# Patient Record
Sex: MALE | Race: WHITE | NOT HISPANIC OR LATINO | Employment: FULL TIME | RURAL
[De-identification: names, ages, dates, MRNs, and addresses within clinical notes are randomized per-mention and may not be internally consistent; named-entity substitution may affect disease eponyms.]

---

## 2019-04-03 ENCOUNTER — OFFICE VISIT (OUTPATIENT)
Dept: RETAIL CLINIC | Facility: CLINIC | Age: 54
End: 2019-04-03

## 2019-04-03 VITALS
WEIGHT: 260 LBS | OXYGEN SATURATION: 98 % | BODY MASS INDEX: 35.21 KG/M2 | RESPIRATION RATE: 14 BRPM | HEART RATE: 87 BPM | TEMPERATURE: 97.7 F | HEIGHT: 72 IN

## 2019-04-03 DIAGNOSIS — H65.01 RIGHT ACUTE SEROUS OTITIS MEDIA, RECURRENCE NOT SPECIFIED: Primary | ICD-10-CM

## 2019-04-03 PROCEDURE — 99213 OFFICE O/P EST LOW 20 MIN: CPT | Performed by: NURSE PRACTITIONER

## 2019-04-03 RX ORDER — METHYLPREDNISOLONE 4 MG/1
TABLET ORAL
Qty: 1 EACH | Refills: 0 | Status: SHIPPED | OUTPATIENT
Start: 2019-04-03 | End: 2022-07-15

## 2019-04-03 RX ORDER — AMOXICILLIN AND CLAVULANATE POTASSIUM 875; 125 MG/1; MG/1
1 TABLET, FILM COATED ORAL 2 TIMES DAILY
Qty: 10 TABLET | Refills: 0 | Status: SHIPPED | OUTPATIENT
Start: 2019-04-03 | End: 2022-07-15

## 2019-04-03 NOTE — PROGRESS NOTES
Heber Valdez is a 53 y.o. male.     Earache    There is pain in the right ear. This is a new problem. Episode onset: 2 days. The problem occurs every few minutes. The problem has been gradually worsening. Maximum temperature: subjective. The fever has been present for less than 1 day. The pain is at a severity of 7/10. The pain is moderate. Associated symptoms include coughing (nonproductive), neck pain and a sore throat. Pertinent negatives include no ear discharge or rhinorrhea. He has tried NSAIDs for the symptoms. The treatment provided no relief.        The following portions of the patient's history were reviewed and updated as appropriate: allergies, current medications, past family history, past medical history, past social history, past surgical history and problem list.    Review of Systems   Constitutional: Positive for fever.   HENT: Positive for congestion (nasal/chest), ear pain, sinus pressure, sinus pain and sore throat. Negative for ear discharge and rhinorrhea.         Right sided jaw pain     Respiratory: Positive for cough (nonproductive). Negative for chest tightness, shortness of breath and wheezing.    Gastrointestinal: Negative.    Musculoskeletal: Positive for neck pain.   Neurological: Positive for dizziness.       Objective   Physical Exam   Constitutional: He appears well-developed and well-nourished.   HENT:   Head: Normocephalic.   Right Ear: Ear canal normal. Tympanic membrane is erythematous and bulging. Tympanic membrane mobility is abnormal.   Left Ear: Tympanic membrane and ear canal normal.   Nose: Right sinus exhibits maxillary sinus tenderness. Left sinus exhibits maxillary sinus tenderness.   Mouth/Throat: Uvula is midline and mucous membranes are normal. Posterior oropharyngeal erythema present. No tonsillar exudate.   Cardiovascular: Normal rate and regular rhythm.   Pulmonary/Chest: Effort normal and breath sounds normal.   Lymphadenopathy:        Head (right  side): Tonsillar adenopathy present.        Head (left side): Tonsillar adenopathy present.     He has cervical adenopathy.   Skin: Skin is warm, dry and intact.   Vitals reviewed.      Assessment/Plan   Ray was seen today for earache.    Diagnoses and all orders for this visit:    Right acute serous otitis media, recurrence not specified    Other orders  -     amoxicillin-clavulanate (AUGMENTIN) 875-125 MG per tablet; Take 1 tablet by mouth 2 (Two) Times a Day.  -     methylPREDNISolone (MEDROL, MARGAUX,) 4 MG tablet; Take as directed on package instructions.          Visit information reviewed with patient, including medication prescribed and patient instructions. All questions answered and given to patient/and or caregiver.     If symptoms worsen with fever >103, please seek further evaluation in the ER. Please F/U with PCP with concerns/and questions.     Rae Duncan, APRN

## 2022-07-06 ENCOUNTER — HOSPITAL ENCOUNTER (EMERGENCY)
Facility: HOSPITAL | Age: 57
Discharge: HOME | End: 2022-07-06
Attending: EMERGENCY MEDICINE | Admitting: EMERGENCY MEDICINE

## 2022-07-06 ENCOUNTER — APPOINTMENT (OUTPATIENT)
Dept: GENERAL RADIOLOGY | Facility: HOSPITAL | Age: 57
End: 2022-07-06

## 2022-07-06 VITALS
RESPIRATION RATE: 18 BRPM | TEMPERATURE: 98.3 F | HEART RATE: 80 BPM | OXYGEN SATURATION: 100 % | SYSTOLIC BLOOD PRESSURE: 121 MMHG | DIASTOLIC BLOOD PRESSURE: 101 MMHG | WEIGHT: 236 LBS | BODY MASS INDEX: 33.79 KG/M2 | HEIGHT: 70 IN

## 2022-07-06 DIAGNOSIS — E86.0 DEHYDRATION: ICD-10-CM

## 2022-07-06 DIAGNOSIS — R07.9 ACUTE CHEST PAIN: Primary | ICD-10-CM

## 2022-07-06 DIAGNOSIS — N17.9 ACUTE KIDNEY INJURY: ICD-10-CM

## 2022-07-06 LAB
ALBUMIN SERPL-MCNC: 4.4 G/DL (ref 3.5–5.2)
ALBUMIN/GLOB SERPL: 1.7 G/DL
ALP SERPL-CCNC: 74 U/L (ref 39–117)
ALT SERPL W P-5'-P-CCNC: 23 U/L (ref 1–41)
ANION GAP SERPL CALCULATED.3IONS-SCNC: 12 MMOL/L (ref 5–15)
AST SERPL-CCNC: 17 U/L (ref 1–40)
BASOPHILS # BLD AUTO: 0.05 10*3/MM3 (ref 0–0.2)
BASOPHILS NFR BLD AUTO: 0.3 % (ref 0–1.5)
BILIRUB SERPL-MCNC: 1 MG/DL (ref 0–1.2)
BUN SERPL-MCNC: 21 MG/DL (ref 6–20)
BUN/CREAT SERPL: 15.4 (ref 7–25)
CALCIUM SPEC-SCNC: 9.2 MG/DL (ref 8.6–10.5)
CHLORIDE SERPL-SCNC: 99 MMOL/L (ref 98–107)
CO2 SERPL-SCNC: 21 MMOL/L (ref 22–29)
CREAT SERPL-MCNC: 1.36 MG/DL (ref 0.76–1.27)
DEPRECATED RDW RBC AUTO: 45.9 FL (ref 37–54)
EGFRCR SERPLBLD CKD-EPI 2021: 61.1 ML/MIN/1.73
EOSINOPHIL # BLD AUTO: 0.05 10*3/MM3 (ref 0–0.4)
EOSINOPHIL NFR BLD AUTO: 0.3 % (ref 0.3–6.2)
ERYTHROCYTE [DISTWIDTH] IN BLOOD BY AUTOMATED COUNT: 13.3 % (ref 12.3–15.4)
FLUAV RNA RESP QL NAA+PROBE: NOT DETECTED
FLUBV RNA RESP QL NAA+PROBE: NOT DETECTED
GLOBULIN UR ELPH-MCNC: 2.6 GM/DL
GLUCOSE SERPL-MCNC: 214 MG/DL (ref 65–99)
HCT VFR BLD AUTO: 49.5 % (ref 37.5–51)
HGB BLD-MCNC: 16.8 G/DL (ref 13–17.7)
HOLD SPECIMEN: NORMAL
IMM GRANULOCYTES # BLD AUTO: 0.14 10*3/MM3 (ref 0–0.05)
IMM GRANULOCYTES NFR BLD AUTO: 0.9 % (ref 0–0.5)
LIPASE SERPL-CCNC: 37 U/L (ref 13–60)
LYMPHOCYTES # BLD AUTO: 2.06 10*3/MM3 (ref 0.7–3.1)
LYMPHOCYTES NFR BLD AUTO: 13.6 % (ref 19.6–45.3)
MCH RBC QN AUTO: 31.9 PG (ref 26.6–33)
MCHC RBC AUTO-ENTMCNC: 33.9 G/DL (ref 31.5–35.7)
MCV RBC AUTO: 93.9 FL (ref 79–97)
MONOCYTES # BLD AUTO: 0.97 10*3/MM3 (ref 0.1–0.9)
MONOCYTES NFR BLD AUTO: 6.4 % (ref 5–12)
NEUTROPHILS NFR BLD AUTO: 11.83 10*3/MM3 (ref 1.7–7)
NEUTROPHILS NFR BLD AUTO: 78.5 % (ref 42.7–76)
NRBC BLD AUTO-RTO: 0 /100 WBC (ref 0–0.2)
NT-PROBNP SERPL-MCNC: 25.2 PG/ML (ref 0–900)
PLATELET # BLD AUTO: 276 10*3/MM3 (ref 140–450)
PMV BLD AUTO: 10.1 FL (ref 6–12)
POTASSIUM SERPL-SCNC: 4.3 MMOL/L (ref 3.5–5.2)
PROT SERPL-MCNC: 7 G/DL (ref 6–8.5)
QT INTERVAL: 334 MS
QTC INTERVAL: 452 MS
RBC # BLD AUTO: 5.27 10*6/MM3 (ref 4.14–5.8)
SARS-COV-2 RNA RESP QL NAA+PROBE: NOT DETECTED
SODIUM SERPL-SCNC: 132 MMOL/L (ref 136–145)
TROPONIN T SERPL-MCNC: <0.01 NG/ML (ref 0–0.03)
TROPONIN T SERPL-MCNC: <0.01 NG/ML (ref 0–0.03)
WBC NRBC COR # BLD: 15.1 10*3/MM3 (ref 3.4–10.8)
WHOLE BLOOD HOLD COAG: NORMAL
WHOLE BLOOD HOLD SPECIMEN: NORMAL

## 2022-07-06 PROCEDURE — 85025 COMPLETE CBC W/AUTO DIFF WBC: CPT | Performed by: EMERGENCY MEDICINE

## 2022-07-06 PROCEDURE — 84484 ASSAY OF TROPONIN QUANT: CPT | Performed by: EMERGENCY MEDICINE

## 2022-07-06 PROCEDURE — 25010000002 KETOROLAC TROMETHAMINE PER 15 MG: Performed by: PHYSICIAN ASSISTANT

## 2022-07-06 PROCEDURE — 93005 ELECTROCARDIOGRAM TRACING: CPT | Performed by: EMERGENCY MEDICINE

## 2022-07-06 PROCEDURE — 96361 HYDRATE IV INFUSION ADD-ON: CPT

## 2022-07-06 PROCEDURE — 71045 X-RAY EXAM CHEST 1 VIEW: CPT

## 2022-07-06 PROCEDURE — 83880 ASSAY OF NATRIURETIC PEPTIDE: CPT | Performed by: EMERGENCY MEDICINE

## 2022-07-06 PROCEDURE — 80053 COMPREHEN METABOLIC PANEL: CPT | Performed by: EMERGENCY MEDICINE

## 2022-07-06 PROCEDURE — 96374 THER/PROPH/DIAG INJ IV PUSH: CPT

## 2022-07-06 PROCEDURE — 99284 EMERGENCY DEPT VISIT MOD MDM: CPT

## 2022-07-06 PROCEDURE — 83690 ASSAY OF LIPASE: CPT | Performed by: EMERGENCY MEDICINE

## 2022-07-06 PROCEDURE — 36415 COLL VENOUS BLD VENIPUNCTURE: CPT

## 2022-07-06 PROCEDURE — 87636 SARSCOV2 & INF A&B AMP PRB: CPT | Performed by: PHYSICIAN ASSISTANT

## 2022-07-06 RX ORDER — KETOROLAC TROMETHAMINE 15 MG/ML
15 INJECTION, SOLUTION INTRAMUSCULAR; INTRAVENOUS ONCE
Status: COMPLETED | OUTPATIENT
Start: 2022-07-06 | End: 2022-07-06

## 2022-07-06 RX ORDER — SODIUM CHLORIDE 0.9 % (FLUSH) 0.9 %
10 SYRINGE (ML) INJECTION AS NEEDED
Status: DISCONTINUED | OUTPATIENT
Start: 2022-07-06 | End: 2022-07-06 | Stop reason: HOSPADM

## 2022-07-06 RX ORDER — ASPIRIN 81 MG/1
324 TABLET, CHEWABLE ORAL ONCE
Status: DISCONTINUED | OUTPATIENT
Start: 2022-07-06 | End: 2022-07-06 | Stop reason: HOSPADM

## 2022-07-06 RX ADMIN — SODIUM CHLORIDE 1000 ML: 9 INJECTION, SOLUTION INTRAVENOUS at 16:01

## 2022-07-06 RX ADMIN — KETOROLAC TROMETHAMINE 15 MG: 15 INJECTION, SOLUTION INTRAMUSCULAR; INTRAVENOUS at 17:32

## 2022-07-06 RX ADMIN — SODIUM CHLORIDE 2190 ML: 9 INJECTION, SOLUTION INTRAVENOUS at 17:06

## 2022-07-06 NOTE — DISCHARGE INSTRUCTIONS
Be sure to stay well-hydrated.  No strenuous activity for the next few days.  You will receive a call from registration by 10 AM on the next business day to schedule your appointment with the chest pain clinic.  If you do not receive a call by 10 AM tomorrow, call [phone], ask to speak with registration, and schedule your appointment with [facility] heart and valve chest pain clinic.  Begin taking a baby aspirin once daily.  Stop smoking.  Have a low threshold to return to the emergency department immediately if you have any change or worsening of symptoms.

## 2022-07-06 NOTE — ED PROVIDER NOTES
" EMERGENCY DEPARTMENT ENCOUNTER    Pt Name: Mic Valdez  MRN: [id number]  Pt :   1965  Room Number:    Date of encounter:  2022  PCP: Provider, No Known  ED Provider: Yon Cardona PA-C    Historian: Patient      HPI:  Chief Complaint: Chest and back pain        Context: Mic Valdez is a 57 y.o. male who presents to the ED c/o onset of chest and back pain when he was working outside in the heat.  Patient states he was working on his truck when he noticed a sudden \"tightness\" on the left side of his chest radiating around into his back, felt like a muscle spasm.  No shortness of breath, no nausea vomiting or diaphoresis.  Not exacerbated by activity and not alleviated with rest.  No arm neck jaw shoulder pain or palpitations.  Patient thinks he was overheated.  He was given 1 sublingual nitroglycerin by EMS.  He presents with a blood pressure 90/67 and a heart rate of 110.  He is now complaining of a dull frontal headache since given the nitroglycerin.  He is a smoker with no prior history of hypertension or known heart disease otherwise.  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      PAST MEDICAL HISTORY  Past Medical History:   Diagnosis Date   • Kidney stone          PAST SURGICAL HISTORY  No past surgical history on file.      FAMILY HISTORY  No family history on file.      SOCIAL HISTORY  Social History     Socioeconomic History   • Marital status: Single   Tobacco Use   • Smoking status: Current Every Day Smoker         ALLERGIES  Patient has no known allergies.        REVIEW OF SYSTEMS  Review of Systems   Constitutional: Positive for activity change. Negative for appetite change, chills, diaphoresis, fatigue and fever.   HENT: Negative for congestion, rhinorrhea and sore throat.    Respiratory: Negative for cough, shortness of breath and wheezing.    Cardiovascular: Positive for chest pain.   Gastrointestinal: Negative for abdominal pain, nausea and vomiting.   Genitourinary: " Negative for dysuria, flank pain and hematuria.   Musculoskeletal: Positive for myalgias (Left chest wall and back musculature).   Neurological: Negative for dizziness, seizures, syncope and headaches.          All systems reviewed and negative except for those discussed in HPI.       PHYSICAL EXAM    I have reviewed the triage vital signs and nursing notes.    ED Triage Vitals [07/06/22 1534]   Temp Heart Rate Resp BP SpO2   98.3 °F (36.8 °C) 110 18 90/67 95 %      Temp src Heart Rate Source Patient Position BP Location FiO2 (%)   Oral Monitor Sitting Right arm --       Physical Exam  GENERAL:   BP 90/67, heart rate 110, patient is awake alert, diaphoretic, does not appear to be in acute distress.  HENT: Nares patent.  Oral membranes are dry.  EYES: No scleral icterus.  CV: Regular rhythm, regular rate.  No murmurs rubs or gallops.  RESPIRATORY: Normal effort.  No audible wheezes, rales or rhonchi.  ABDOMEN: Soft, nontender, no guarding or rebound tenderness.  Bowel sounds are normal.  MUSCULOSKELETAL: No deformities.   NEURO: Alert, moves all extremities, follows commands.  SKIN: Warm, dry, no rash visualized.        LAB RESULTS  Recent Results (from the past 24 hour(s))   Troponin    Collection Time: 07/06/22  3:42 PM    Specimen: Blood   Result Value Ref Range    Troponin T <0.010 0.000 - 0.030 ng/mL   Comprehensive Metabolic Panel    Collection Time: 07/06/22  3:42 PM    Specimen: Blood   Result Value Ref Range    Glucose 214 (H) 65 - 99 mg/dL    BUN 21 (H) 6 - 20 mg/dL    Creatinine 1.36 (H) 0.76 - 1.27 mg/dL    Sodium 132 (L) 136 - 145 mmol/L    Potassium 4.3 3.5 - 5.2 mmol/L    Chloride 99 98 - 107 mmol/L    CO2 21.0 (L) 22.0 - 29.0 mmol/L    Calcium 9.2 8.6 - 10.5 mg/dL    Total Protein 7.0 6.0 - 8.5 g/dL    Albumin 4.40 3.50 - 5.20 g/dL    ALT (SGPT) 23 1 - 41 U/L    AST (SGOT) 17 1 - 40 U/L    Alkaline Phosphatase 74 39 - 117 U/L    Total Bilirubin 1.0 0.0 - 1.2 mg/dL    Globulin 2.6 gm/dL    A/G Ratio  1.7 g/dL    BUN/Creatinine Ratio 15.4 7.0 - 25.0    Anion Gap 12.0 5.0 - 15.0 mmol/L    eGFR 61.1 >60.0 mL/min/1.73   Lipase    Collection Time: 07/06/22  3:42 PM    Specimen: Blood   Result Value Ref Range    Lipase 37 13 - 60 U/L   BNP    Collection Time: 07/06/22  3:42 PM    Specimen: Blood   Result Value Ref Range    proBNP 25.2 0.0 - 900.0 pg/mL   Green Top (Gel)    Collection Time: 07/06/22  3:42 PM   Result Value Ref Range    Extra Tube Hold for add-ons.    Lavender Top    Collection Time: 07/06/22  3:42 PM   Result Value Ref Range    Extra Tube hold for add-on    Gold Top - SST    Collection Time: 07/06/22  3:42 PM   Result Value Ref Range    Extra Tube Hold for add-ons.    Gray Top    Collection Time: 07/06/22  3:42 PM   Result Value Ref Range    Extra Tube Hold for add-ons.    Light Blue Top    Collection Time: 07/06/22  3:42 PM   Result Value Ref Range    Extra Tube Hold for add-ons.    CBC Auto Differential    Collection Time: 07/06/22  3:42 PM    Specimen: Blood   Result Value Ref Range    WBC 15.10 (H) 3.40 - 10.80 10*3/mm3    RBC 5.27 4.14 - 5.80 10*6/mm3    Hemoglobin 16.8 13.0 - 17.7 g/dL    Hematocrit 49.5 37.5 - 51.0 %    MCV 93.9 79.0 - 97.0 fL    MCH 31.9 26.6 - 33.0 pg    MCHC 33.9 31.5 - 35.7 g/dL    RDW 13.3 12.3 - 15.4 %    RDW-SD 45.9 37.0 - 54.0 fl    MPV 10.1 6.0 - 12.0 fL    Platelets 276 140 - 450 10*3/mm3    Neutrophil % 78.5 (H) 42.7 - 76.0 %    Lymphocyte % 13.6 (L) 19.6 - 45.3 %    Monocyte % 6.4 5.0 - 12.0 %    Eosinophil % 0.3 0.3 - 6.2 %    Basophil % 0.3 0.0 - 1.5 %    Immature Grans % 0.9 (H) 0.0 - 0.5 %    Neutrophils, Absolute 11.83 (H) 1.70 - 7.00 10*3/mm3    Lymphocytes, Absolute 2.06 0.70 - 3.10 10*3/mm3    Monocytes, Absolute 0.97 (H) 0.10 - 0.90 10*3/mm3    Eosinophils, Absolute 0.05 0.00 - 0.40 10*3/mm3    Basophils, Absolute 0.05 0.00 - 0.20 10*3/mm3    Immature Grans, Absolute 0.14 (H) 0.00 - 0.05 10*3/mm3    nRBC 0.0 0.0 - 0.2 /100 WBC   COVID-19 and FLU A/B PCR -  Swab, Nasopharynx    Collection Time: 07/06/22  4:02 PM    Specimen: Nasopharynx; Swab   Result Value Ref Range    COVID19 Not Detected Not Detected - Ref. Range    Influenza A PCR Not Detected Not Detected    Influenza B PCR Not Detected Not Detected   Troponin    Collection Time: 07/06/22  5:33 PM    Specimen: Blood   Result Value Ref Range    Troponin T <0.010 0.000 - 0.030 ng/mL       If labs were ordered, I independently reviewed the results.        RADIOLOGY  XR Chest 1 View    Result Date: 7/6/2022  Examination: XR CHEST 1 VW-  Date of Exam: 7/6/2022 3:34 PM  Indication: Chest Pain Triage Protocol.  Comparison: None available.  Technique: 1 view of the chest  Findings: There are no airspace consolidations. No pleural effusions. No pneumothorax. The heart size is normal. The pulmonary vasculature appears within normal limits. No acute osseous abnormality identified.      No acute cardiopulmonary process.  This report was finalized on 7/6/2022 4:01 PM by Terrence Waller MD.            PROCEDURES    Procedures    ECG 12 Lead   Preliminary Result         ECG 12 Lead   Preliminary Result             MEDICATIONS GIVEN IN ER    Medications   sodium chloride 0.9 % bolus 1,000 mL (0 mL Intravenous Stopped 7/6/22 1710)   sodium chloride 0.9% - IBW for BMI > 30 bolus 2,190 mL (0 mL/kg × 73 kg (Ideal) Intravenous Stopped 7/6/22 1955)   ketorolac (TORADOL) injection 15 mg (15 mg Intravenous Given 7/6/22 1732)         PROGRESS, DATA ANALYSIS, CONSULTS, AND MEDICAL DECISION MAKING    All labs have been independently reviewed by me.  All radiology studies have been reviewed by me and the radiologist dictating the report.   EKG's have been independently viewed and interpreted by me.      Differential diagnoses: Angina, chest wall pain, volume depletion/dehydration      ED Course as of 07/06/22 2244   Wed Jul 06, 2022   1713 BP 87/66 at 1630 hrs.  Patient had his cuffed arm elevated on the side rail, with his elbow bent.  Recheck  with arm in the proper position was 106/73. [TG]      ED Course User Index  [TG] Yon Cardona PA-C       Patient remained stable throughout course of stay in the emergency department.  After a fluid bolus he was feeling significantly better.  No acute findings on EKG or chest x-ray.  Serial troponins are within normal limits.  He does have a slightly elevated creatinine, with a sodium of 132 and a CO2 of 21.  No prior background information on file.  He did have slightly elevated white blood cell count of 15.1.  He tested negative for COVID influenza A and influenza B.  We will discharge to home with referral to chest pain clinic, he is rest and moderate his activity until evaluated by the chest pain clinic, and have a low threshold to return if he has any change or worsening of symptoms.  He verbalizes understanding and is agreeable to plan.      AS OF 22:44 EDT VITALS:    BP - (!) 121/101  HR - 80  TEMP - 98.3 °F (36.8 °C) (Oral)  O2 SATS - 100%                  DIAGNOSIS  Final diagnoses:   Acute chest pain   Dehydration   Acute kidney injury (HCC)         DISPOSITION  DISCHARGE    Patient discharged in stable condition.    Reviewed implications of results, diagnosis, meds, responsibility to follow up, warning signs and symptoms of possible worsening, potential complications and reasons to return to ER.    Patient/Family voiced understanding of above instructions.    Discussed plan for discharge, as there is no emergent indication for admission.  Pt/family is agreeable and understands need for follow up and possible repeat testing.  Pt/family is aware that discharge does not mean that nothing is wrong but that it indicates no emergency is currently present that requires admission and they must continue care with follow-up as given below or with a physician of their choice.     FO[facility]UP  [facility]  [address]  [facility] [address] [phone]              Medication List      No changes were made to your prescriptions during this visit.                  Yon Cardona PA-C  07/06/22 5783

## 2022-07-12 LAB
QT INTERVAL: 380 MS
QTC INTERVAL: 427 MS

## 2022-07-14 NOTE — PROGRESS NOTES
"[facility]  [facility] and Valve Center    Chief Complaint  Chest Pain and Establish Care    Subjective    History of Present Illness {CC  Problem List  Visit  Diagnosis   Encounters  Notes  Medications  Labs  Result Review Imaging  Media :23}     Mic Valdez is a 57 y.o. male with HTN, hyperlipidemia who presents today for evaluation of chest pain at the request of Yon Cardona PA-C.  Patient presented the ED on 7/6 with complaints of Chest and back pain while working on the heat.  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 He said it felt like it was \"drawing\". Lasted about a minute. He reports that he did feel overheated and hadn't had much water.  Denies any shortness of breath or palpitations.  Troponins normal.  EKG showed no ischemic changes.  He reports he is active without any shortness of breath or chest pain.  Denies any palpitations, dizziness, lightheadedness. He has had no further chest pain         Cardiac risks: HTN, hyperlipidemia, diabetes, gender, obesity, tobacco abuse (1ppd, trying to quit)    Objective     Vital Signs:   Vitals:    07/15/22 1526 07/15/22 1528 07/15/22 1529   BP: 138/94 149/93 133/93   BP Location: Right arm Left arm Left arm   Patient Position: Sitting Standing Sitting   Cuff Size: Adult Adult Adult   Pulse:  92 79   Resp:   19   Temp:   97.6 °F (36.4 °C)   TempSrc:   Temporal   SpO2:  98% 98%   Weight:   107 kg (236 lb)   Height:   177.8 cm (70\")     Body mass index is 33.86 kg/m².  Physical Exam  Vitals reviewed.   Constitutional:       Appearance: Normal appearance.   HENT:      Head: Normocephalic.   Neck:      Vascular: No carotid bruit.   Cardiovascular:      Rate and Rhythm: Normal rate and regular rhythm.      Pulses: Normal pulses.      Heart sounds: Normal heart sounds, S1 normal and S2 normal. No murmur heard.  Pulmonary:      " Effort: Pulmonary effort is normal. No respiratory distress.      Breath sounds: Normal breath sounds.   Chest:      Chest wall: No tenderness.   Abdominal:      General: Abdomen is flat.      Palpations: Abdomen is soft.   Musculoskeletal:      Cervical back: Neck supple.      Right lower leg: No edema.      Left lower leg: No edema.   Skin:     General: Skin is warm and dry.   Neurological:      General: No focal deficit present.      Mental Status: He is alert and oriented to person, place, and time. Mental status is at baseline.   Psychiatric:         Mood and Affect: Mood normal.         Behavior: Behavior normal.         Thought Content: Thought content normal.              Result Review  Data Reviewed:{ Labs  Result Review  Imaging  Med Tab  Media :23}   Troponin (07/06/2022 17:33)  COVID PRE-OP / PRE-PROCEDURE SCREENING ORDER (NO ISOLATION) - Swab, Nasopharynx (07/06/2022 16:02)  BNP (07/06/2022 15:42)  Lipase (07/06/2022 15:42)  Troponin (07/06/2022 15:42)  CBC & Differential (07/06/2022 15:42)  Comprehensive Metabolic Panel (07/06/2022 15:42)  ECG 12 Lead (07/06/2022 17:37)  ECG 12 Lead (07/06/2022 15:31)    Consultant notes Yon Cardona PA-C            Assessment and Plan {CC Problem List  Visit Diagnosis  ROS  Review (Popup)  Health Maintenance  Quality  BestPractice  Medications  SmartSets  SnapShot Encounters  Media :23}   1. Chest pain, unspecified type  He describes more of a muscle spasm rather than chest pain.  He currently is asymptomatic.  We discussed his multiple ASCVD risks and indications for a stress test.  Since he currently is asymptomatic will defer further stress test.  I advised him that I have a low threshold to order stress test considering ASCVD risk.  I advised him to call me if he develops any symptoms and will order a stress test.  If we do, we will need to order a Lexiscan secondary to his knee arthritis  We had a long discussion regarding cardiac prevention  including good glycemic, blood pressure and cholesterol control as well as tobacco cessation, healthy diet and regular exercise    2. Primary hypertension  Appears controlled  Continue current medications    3. Hyperlipidemia, unspecified hyperlipidemia type  Continue statin therapy    4. Sleep apnea, unspecified type  Encourage compliance with CPAP    5. Tobacco abuse  Currently is working on quitting.  Discussed risks of ongoing tobacco abuse and encouraged cessation        Follow Up {Instructions Charge Capture  Follow-up Communications :23}   Return if symptoms worsen or fail to improve.    Patient was given instructions and counseling regarding his condition or for health maintenance advice. Please see specific information pulled into the AVS if appropriate.  Advised to call the Heart and Valve Center with any questions, concerns, or worsening symptoms.

## 2022-07-15 ENCOUNTER — OFFICE VISIT (OUTPATIENT)
Dept: CARDIOLOGY | Facility: HOSPITAL | Age: 57
End: 2022-07-15

## 2022-07-15 VITALS
OXYGEN SATURATION: 98 % | BODY MASS INDEX: 33.79 KG/M2 | HEIGHT: 70 IN | DIASTOLIC BLOOD PRESSURE: 93 MMHG | SYSTOLIC BLOOD PRESSURE: 133 MMHG | HEART RATE: 79 BPM | TEMPERATURE: 97.6 F | WEIGHT: 236 LBS | RESPIRATION RATE: 19 BRPM

## 2022-07-15 DIAGNOSIS — Z72.0 TOBACCO ABUSE: ICD-10-CM

## 2022-07-15 DIAGNOSIS — E78.5 HYPERLIPIDEMIA, UNSPECIFIED HYPERLIPIDEMIA TYPE: ICD-10-CM

## 2022-07-15 DIAGNOSIS — R07.9 CHEST PAIN, UNSPECIFIED TYPE: Primary | ICD-10-CM

## 2022-07-15 DIAGNOSIS — G47.30 SLEEP APNEA, UNSPECIFIED TYPE: ICD-10-CM

## 2022-07-15 DIAGNOSIS — I10 PRIMARY HYPERTENSION: ICD-10-CM

## 2022-07-15 PROCEDURE — 99204 OFFICE O/P NEW MOD 45 MIN: CPT | Performed by: NURSE PRACTITIONER

## 2022-07-15 RX ORDER — OMEPRAZOLE 40 MG/1
40 CAPSULE, DELAYED RELEASE ORAL DAILY
COMMUNITY

## 2022-07-15 RX ORDER — PRAVASTATIN SODIUM 40 MG
40 TABLET ORAL DAILY
COMMUNITY
Start: 2022-06-28

## 2022-07-15 RX ORDER — LISINOPRIL 30 MG/1
30 TABLET ORAL DAILY
COMMUNITY
Start: 2022-05-04

## 2022-07-15 RX ORDER — MELOXICAM 15 MG/1
15 TABLET ORAL DAILY
COMMUNITY
Start: 2022-06-28

## 2022-07-15 RX ORDER — METFORMIN HYDROCHLORIDE 500 MG/1
500 TABLET, EXTENDED RELEASE ORAL DAILY
COMMUNITY
Start: 2022-04-20